# Patient Record
Sex: MALE | URBAN - METROPOLITAN AREA
[De-identification: names, ages, dates, MRNs, and addresses within clinical notes are randomized per-mention and may not be internally consistent; named-entity substitution may affect disease eponyms.]

---

## 2019-06-18 ENCOUNTER — APPOINTMENT (RX ONLY)
Dept: URBAN - METROPOLITAN AREA CLINIC 8 | Facility: CLINIC | Age: 74
Setting detail: DERMATOLOGY
End: 2019-06-18

## 2019-06-18 DIAGNOSIS — L82.1 OTHER SEBORRHEIC KERATOSIS: ICD-10-CM

## 2019-06-18 DIAGNOSIS — L81.4 OTHER MELANIN HYPERPIGMENTATION: ICD-10-CM

## 2019-06-18 DIAGNOSIS — D22 MELANOCYTIC NEVI: ICD-10-CM

## 2019-06-18 DIAGNOSIS — D485 NEOPLASM OF UNCERTAIN BEHAVIOR OF SKIN: ICD-10-CM

## 2019-06-18 PROBLEM — E78.5 HYPERLIPIDEMIA, UNSPECIFIED: Status: ACTIVE | Noted: 2019-06-18

## 2019-06-18 PROBLEM — D22.9 MELANOCYTIC NEVI, UNSPECIFIED: Status: ACTIVE | Noted: 2019-06-18

## 2019-06-18 PROBLEM — E13.9 OTHER SPECIFIED DIABETES MELLITUS WITHOUT COMPLICATIONS: Status: ACTIVE | Noted: 2019-06-18

## 2019-06-18 PROBLEM — D48.5 NEOPLASM OF UNCERTAIN BEHAVIOR OF SKIN: Status: ACTIVE | Noted: 2019-06-18

## 2019-06-18 PROBLEM — I10 ESSENTIAL (PRIMARY) HYPERTENSION: Status: ACTIVE | Noted: 2019-06-18

## 2019-06-18 PROCEDURE — 11301 SHAVE SKIN LESION 0.6-1.0 CM: CPT

## 2019-06-18 PROCEDURE — ? PRESCRIPTION

## 2019-06-18 PROCEDURE — ? COUNSELING

## 2019-06-18 PROCEDURE — 99214 OFFICE O/P EST MOD 30 MIN: CPT | Mod: 25

## 2019-06-18 PROCEDURE — ? BIOPSY BY SHAVE METHOD

## 2019-06-18 PROCEDURE — 11301 SHAVE SKIN LESION 0.6-1.0 CM: CPT | Mod: 76

## 2019-06-18 PROCEDURE — ? SHAVE REMOVAL

## 2019-06-18 PROCEDURE — 11102 TANGNTL BX SKIN SINGLE LES: CPT | Mod: 59

## 2019-06-18 RX ORDER — MUPIROCIN 20 MG/G
1 OINTMENT TOPICAL BID
Qty: 1 | Refills: 1 | Status: ERX | COMMUNITY
Start: 2019-06-18

## 2019-06-18 RX ADMIN — MUPIROCIN 1: 20 OINTMENT TOPICAL at 19:33

## 2019-06-18 ASSESSMENT — LOCATION ZONE DERM
LOCATION ZONE: FACE
LOCATION ZONE: LEG
LOCATION ZONE: TRUNK

## 2019-06-18 ASSESSMENT — LOCATION SIMPLE DESCRIPTION DERM
LOCATION SIMPLE: LEFT CHEEK
LOCATION SIMPLE: RIGHT LOWER BACK
LOCATION SIMPLE: LEFT PRETIBIAL REGION

## 2019-06-18 ASSESSMENT — LOCATION DETAILED DESCRIPTION DERM
LOCATION DETAILED: LEFT PROXIMAL PRETIBIAL REGION
LOCATION DETAILED: LEFT CENTRAL MALAR CHEEK
LOCATION DETAILED: RIGHT INFERIOR LATERAL MIDBACK

## 2019-06-18 NOTE — PROCEDURE: BIOPSY BY SHAVE METHOD
Biopsy Method: double edge Personna blade
Anesthesia Volume In Cc (Will Not Render If 0): 0.5
X Size Of Lesion In Cm: 0
Lab Facility: [id number]
Notification Instructions: Patient will be notified of biopsy results. However, patient instructed to call the office if not contacted within 2 weeks.
Render Post-Care Instructions In Note?: yes
Destruction After The Procedure: No
Silver Nitrate Text: The wound bed was treated with silver nitrate after the biopsy was performed.
Hemostasis: Drysol
Cryotherapy Text: The wound bed was treated with cryotherapy after the biopsy was performed.
Post-Care Instructions: I reviewed with the patient in detail post-care instructions. Patient is to keep the biopsy site dry overnight, and then apply petrolatum twice daily until healed. Patient may apply warm water soaks to remove any crusting.
Consent: Written consent was obtained and risks were reviewed including but not limited to scarring, pain, infection, bleeding, scabbing, incomplete removal, nerve damage and allergy to anesthesia.
Wound Care: Petrolatum
Biopsy Type: H and E
Anesthesia Type: 1% lidocaine with epinephrine
Type Of Destruction Used: Curettage
Electrodesiccation And Curettage Text: The wound bed was treated with electrodesiccation and curettage after the biopsy was performed.
Depth Of Biopsy: dermis
Detail Level: Detailed
Curettage Text: The wound bed was treated with curettage after the biopsy was performed.
Billing Type: Third-Party Bill
Lab: [id number]
Dressing: bandage
Electrodesiccation Text: The wound bed was treated with electrodesiccation after the biopsy was performed.

## 2019-06-18 NOTE — PROCEDURE: SHAVE REMOVAL
Consent was obtained from the patient. The risks and benefits to therapy were discussed in detail. Specifically, the risks of infection, scarring, bleeding, prolonged wound healing, incomplete removal, allergy to anesthesia, nerve injury and recurrence were addressed. Prior to the procedure, the treatment site was clearly identified and confirmed by the patient. All components of Universal Protocol/PAUSE Rule completed.
Lab Facility: [id number]
Render Post-Care Instructions In Note?: no
Billing Type: Third-Party Bill
Medical Necessity Clause: This procedure was medically necessary because the lesion that was treated was:
Path Notes (To The Dermatopathologist): Please check margins.
X Size Of Lesion In Cm (Optional): 0.7
Medical Necessity Information: It is in your best interest to select a reason for this procedure from the list below. All of these items fulfill various CMS LCD requirements except the new and changing color options.
Anesthesia Volume In Cc: 0.5
Notification Instructions: Patient will be notified of biopsy results. However, patient instructed to call the office if not contacted within 2 weeks.
Hemostasis: Drysol
Was A Bandage Applied: Yes
Wound Care: Petrolatum
Anesthesia Type: 1% lidocaine with epinephrine
Post-Care Instructions: I reviewed with the patient in detail post-care instructions. Patient is to keep the biopsy site dry overnight, and then apply bacitracin twice daily until healed. Patient may apply hydrogen peroxide soaks to remove any crusting.
Lab: -[id number]
Biopsy Method: Personna blade
Detail Level: Detailed
Size Of Lesion In Cm (Required): 0.8
Billing Type: Third-Party Bill
Lab Facility: [id number]

## 2019-07-01 ENCOUNTER — APPOINTMENT (RX ONLY)
Dept: URBAN - METROPOLITAN AREA CLINIC 42 | Facility: CLINIC | Age: 74
Setting detail: DERMATOLOGY
End: 2019-07-01

## 2019-07-01 DIAGNOSIS — L57.0 ACTINIC KERATOSIS: ICD-10-CM

## 2019-07-01 PROCEDURE — ? LIQUID NITROGEN

## 2019-07-01 PROCEDURE — 17000 DESTRUCT PREMALG LESION: CPT

## 2019-07-01 PROCEDURE — ? COUNSELING

## 2019-07-01 ASSESSMENT — LOCATION SIMPLE DESCRIPTION DERM: LOCATION SIMPLE: LEFT CHEEK

## 2019-07-01 ASSESSMENT — LOCATION DETAILED DESCRIPTION DERM: LOCATION DETAILED: LEFT CENTRAL MALAR CHEEK

## 2019-07-01 ASSESSMENT — LOCATION ZONE DERM: LOCATION ZONE: FACE

## 2019-07-01 NOTE — PROCEDURE: MIPS QUALITY
Detail Level: Detailed
Quality 111:Pneumonia Vaccination Status For Older Adults: Pneumococcal Vaccination Previously Received
Quality 131: Pain Assessment And Follow-Up: Pain assessment using a standardized tool is documented as negative, no follow-up plan required
Quality 110: Preventive Care And Screening: Influenza Immunization: Influenza Immunization not Administered because Patient Refused.

## 2019-11-27 NOTE — HPI: EVALUATION OF SKIN LESION(S)
What Type Of Note Output Would You Prefer (Optional)?: Standard Output
Hpi Title: Evaluation of Skin Lesions
How Severe Are Your Spot(S)?: mild
Have Your Spot(S) Been Treated In The Past?: has not been treated
Additional History: Pt is concerned about a spot on is left cheek and on back, duration is several years, pt denies any prior treatment
98.5

## 2023-09-18 NOTE — PROCEDURE: LIQUID NITROGEN
Detail Level: Detailed
Duration Of Freeze Thaw-Cycle (Seconds): 0
Render Note In Bullet Format When Appropriate: No
Consent: The patient's consent was obtained including but not limited to risks of crusting, scabbing, blistering, scarring, darker or lighter pigmentary change, recurrence, incomplete removal and infection.
Render Post-Care Instructions In Note?: yes
Number Of Freeze-Thaw Cycles: 2 freeze-thaw cycles
Post-Care Instructions: I reviewed with the patient in detail post-care instructions. Patient is to wear sunprotection, and avoid picking at any of the treated lesions. Patient may apply Vaseline to crusted or scabbing areas.
Statement Selected